# Patient Record
Sex: MALE | URBAN - METROPOLITAN AREA
[De-identification: names, ages, dates, MRNs, and addresses within clinical notes are randomized per-mention and may not be internally consistent; named-entity substitution may affect disease eponyms.]

---

## 2018-10-17 ENCOUNTER — INPATIENT (INPATIENT)
Facility: HOSPITAL | Age: 78
LOS: 1 days | Discharge: HOME | DRG: 287 | End: 2018-10-19
Attending: INTERNAL MEDICINE | Admitting: INTERNAL MEDICINE
Payer: COMMERCIAL

## 2018-10-17 VITALS
SYSTOLIC BLOOD PRESSURE: 159 MMHG | HEIGHT: 65 IN | HEART RATE: 60 BPM | OXYGEN SATURATION: 100 % | DIASTOLIC BLOOD PRESSURE: 79 MMHG | WEIGHT: 186.07 LBS | RESPIRATION RATE: 16 BRPM

## 2018-10-17 PROCEDURE — 93010 ELECTROCARDIOGRAM REPORT: CPT

## 2018-10-17 PROCEDURE — 99222 1ST HOSP IP/OBS MODERATE 55: CPT

## 2018-10-17 RX ORDER — METOPROLOL TARTRATE 50 MG
25 TABLET ORAL DAILY
Qty: 0 | Refills: 0 | Status: DISCONTINUED | OUTPATIENT
Start: 2018-10-18 | End: 2018-10-18

## 2018-10-17 RX ORDER — ATORVASTATIN CALCIUM 80 MG/1
40 TABLET, FILM COATED ORAL AT BEDTIME
Qty: 0 | Refills: 0 | Status: DISCONTINUED | OUTPATIENT
Start: 2018-10-17 | End: 2018-10-19

## 2018-10-17 RX ORDER — CLOPIDOGREL BISULFATE 75 MG/1
150 TABLET, FILM COATED ORAL ONCE
Qty: 0 | Refills: 0 | Status: COMPLETED | OUTPATIENT
Start: 2018-10-17 | End: 2018-10-17

## 2018-10-17 RX ORDER — ASPIRIN/CALCIUM CARB/MAGNESIUM 324 MG
243 TABLET ORAL ONCE
Qty: 0 | Refills: 0 | Status: COMPLETED | OUTPATIENT
Start: 2018-10-17 | End: 2018-10-17

## 2018-10-17 RX ORDER — SODIUM CHLORIDE 9 MG/ML
500 INJECTION INTRAMUSCULAR; INTRAVENOUS; SUBCUTANEOUS
Qty: 0 | Refills: 0 | Status: DISCONTINUED | OUTPATIENT
Start: 2018-10-17 | End: 2018-10-17

## 2018-10-17 RX ORDER — ZOLPIDEM TARTRATE 10 MG/1
1 TABLET ORAL
Qty: 0 | Refills: 0 | COMMUNITY

## 2018-10-17 RX ORDER — TAMSULOSIN HYDROCHLORIDE 0.4 MG/1
0.4 CAPSULE ORAL AT BEDTIME
Qty: 0 | Refills: 0 | Status: DISCONTINUED | OUTPATIENT
Start: 2018-10-17 | End: 2018-10-19

## 2018-10-17 RX ORDER — CHLORHEXIDINE GLUCONATE 213 G/1000ML
1 SOLUTION TOPICAL ONCE
Qty: 0 | Refills: 0 | Status: DISCONTINUED | OUTPATIENT
Start: 2018-10-17 | End: 2018-10-19

## 2018-10-17 RX ORDER — ASPIRIN/CALCIUM CARB/MAGNESIUM 324 MG
243 TABLET ORAL ONCE
Qty: 0 | Refills: 0 | Status: DISCONTINUED | OUTPATIENT
Start: 2018-10-17 | End: 2018-10-17

## 2018-10-17 RX ORDER — ASPIRIN/CALCIUM CARB/MAGNESIUM 324 MG
81 TABLET ORAL DAILY
Qty: 0 | Refills: 0 | Status: DISCONTINUED | OUTPATIENT
Start: 2018-10-17 | End: 2018-10-19

## 2018-10-17 RX ORDER — CLOPIDOGREL BISULFATE 75 MG/1
150 TABLET, FILM COATED ORAL ONCE
Qty: 0 | Refills: 0 | Status: DISCONTINUED | OUTPATIENT
Start: 2018-10-17 | End: 2018-10-17

## 2018-10-17 RX ORDER — SODIUM CHLORIDE 9 MG/ML
500 INJECTION INTRAMUSCULAR; INTRAVENOUS; SUBCUTANEOUS
Qty: 0 | Refills: 0 | Status: DISCONTINUED | OUTPATIENT
Start: 2018-10-17 | End: 2018-10-19

## 2018-10-17 RX ORDER — APIXABAN 2.5 MG/1
5 TABLET, FILM COATED ORAL EVERY 12 HOURS
Qty: 0 | Refills: 0 | Status: DISCONTINUED | OUTPATIENT
Start: 2018-10-18 | End: 2018-10-19

## 2018-10-17 RX ADMIN — CLOPIDOGREL BISULFATE 150 MILLIGRAM(S): 75 TABLET, FILM COATED ORAL at 18:46

## 2018-10-17 RX ADMIN — TAMSULOSIN HYDROCHLORIDE 0.4 MILLIGRAM(S): 0.4 CAPSULE ORAL at 22:10

## 2018-10-17 RX ADMIN — ATORVASTATIN CALCIUM 40 MILLIGRAM(S): 80 TABLET, FILM COATED ORAL at 22:10

## 2018-10-17 RX ADMIN — Medication 243 MILLIGRAM(S): at 18:46

## 2018-10-17 NOTE — H&P ADULT - HISTORY OF PRESENT ILLNESS
SKELETON     77 yo Uzbek speaking Male, former smoker, PMHx HTN, hyperlipidemia, DM type 2, CVA 2013 (residual right facial droop and right sided weakness) presented to [facility] on 10/14/18 after an episode of dizziness lasting 25 minutes while at [employer], relieved with orange juice. Pt states he had breakfast and did not take his Glyburide/Metformin. Pt states he had a similar episode 1 year ago but did not seek medical attention. Denies trauma, headache, fever, LOC, aura before the attacks. Also denies chest pain, SOB, nausea, diarrhea. SKELETON     79 yo Turkish speaking Male, former smoker, PMHx HTN, hyperlipidemia, DM type 2, CVA 2013 (residual right facial droop and right sided weakness) presented to [facility] on 10/14/18 after an episode of dizziness lasting 25 minutes while at [employer], relieved with orange juice. Pt states he had breakfast and did not take his Glyburide/Metformin. Pt states he had a similar episode 1 year ago but did not seek medical attention. Denies trauma, headache, fever, LOC, aura before the attacks. Also denies chest pain, SOB, nausea, diarrhea. CT head 10/14/18 negative for acute changes. Moderate chronic microvascular ischemic changes and remote lacunar infarcts. Multiple air-fluid levels c/w acute sinusitus. CXR negative. Troponin I elevated at 0.252 and EKG NSR with PAC, LAD, poor R wave progression, u wave. Pt underwent diagnostic cardiac cath on 10/15/18 SKELETON    77 yo Kyrgyz speaking Male, former smoker, PMHx HTN, hyperlipidemia, DM type 2, CVA 2013 (residual right facial droop and right sided weakness) presented to [facility] on 10/14/18 after an episode of dizziness lasting 25 minutes while at [employer], relieved with orange juice. Pt states he had breakfast and did not take his Glyburide/Metformin. Pt states he had a similar episode 1 year ago but did not seek medical attention. Denies trauma, headache, fever, LOC, aura before the attacks. Also denies chest pain, SOB, nausea, diarrhea. CT head 10/14/18 negative for acute changes. Moderate chronic microvascular ischemic changes and remote lacunar infarcts. Multiple air-fluid levels c/w acute sinusitus. CXR negative. Troponin I elevated at 0.252 and EKG NSR with PAC, LAD, poor R wave progression, u wave. Pt underwent diagnostic cardiac cath on 10/15/18 LM nl. dLAD 75%. oD1 75%. mLCX 65%. oOM1 65%. EF 55%. EDP 21mmHg. Echo 10/15/18 mild concentric LVH. EF 55-60%. Mild MR/TR. PASP 30mmHg. SKELETON    79 yo Tajik speaking Male, former smoker, PMHx HTN, hyperlipidemia, DM type 2, CVA 2013 (residual right facial droop and right sided weakness) presented to [facility] on 10/14/18 after an episode of dizziness lasting 25 minutes while at [employer], relieved with orange juice. Pt states he had breakfast and did not take his Glyburide/Metformin. Pt states he had a similar episode 1 year ago but did not seek medical attention. Denies trauma, headache, fever, LOC, aura before the attacks. Also denies chest pain, SOB, nausea, diarrhea. CT head 10/14/18 negative for acute changes. Moderate chronic microvascular ischemic changes and remote lacunar infarcts. Multiple air-fluid levels c/w acute sinusitus. CXR negative. Troponin I elevated at 0.252 and EKG NSR with PAC, LAD, poor R wave progression, u wave. Pt underwent diagnostic cardiac cath on 10/15/18 LM nl. dLAD 75%. oD1 75%. mLCX 65%. oOM1 65%. EF 55%. EDP 21mmHg. Echo 10/15/18 mild concentric LVH. EF 55-60%. Mild MR/TR. PASP 30mmHg.     In light of patient's risk factors, CCS Class 3 anginal symptoms, and abnormal diagnostic cardiac catheterization pt is transferred to [facility] for cardiac catheterization with FFR and possible intervention. SKELETON    79 yo Yi speaking Male, former smoker, PMHx HTN, hyperlipidemia, DM type 2, CVA 2013 (residual right facial droop and right sided weakness) presented to [facility] on 10/14/18 after an episode of dizziness lasting 25 minutes while at [employer], relieved with orange juice. Pt states he had breakfast and did not take his Glyburide/Metformin. Pt states he had a similar episode 1 year ago but did not seek medical attention. Denies trauma, headache, fever, LOC, aura before the attacks. Also denies chest pain, SOB, nausea, diarrhea. CT head 10/14/18 negative for acute changes. Moderate chronic microvascular ischemic changes and remote lacunar infarcts. Multiple air-fluid levels c/w acute sinusitus. CXR negative. Troponin I elevated at 0.252 and EKG NSR with PAC, LAD, poor R wave progression, u wave. Pt underwent diagnostic cardiac cath on 10/15/18 LM nl. dLAD 75%. oD1 75%. mLCX 65%. oOM1 65%. EF 55%. EDP 21mmHg. Right radial access. Echo 10/15/18 mild concentric LVH. EF 55-60%. Mild MR/TR. PASP 30mmHg. As per nurse at [facility] pt diagnosed with new onset rate controlled aflutter at [facility]. Pt placed on a heparin drip on 10/15/18 post cardiac cath. Pt received Plavix 450mg at [facility] and Aspirin 81mg on 10/17/18 AM. Pt will need Aspirin 243mg and Plavix 150mg prior to cardiac catheterization at [facility].     In light of patient's risk factors, CCS Class 3 anginal symptoms, and abnormal diagnostic cardiac catheterization pt is transferred to [facility] for cardiac catheterization with FFR and possible intervention. 77 yo Greek speaking Male, former smoker, PMHx HTN, hyperlipidemia, DM type 2, CVA 2013 (residual right facial droop and right sided weakness) presented to [facility] on 10/14/18 after an episode of dizziness lasting 25 minutes while at [employer], relieved with orange juice. Pt states he had breakfast and did not take his Glyburide/Metformin. Pt states he had a similar episode 1 year ago but did not seek medical attention. Denies trauma, headache, fever, LOC, aura before the attacks. Also denies chest pain, SOB, nausea, diarrhea. CT head 10/14/18 negative for acute changes. Moderate chronic microvascular ischemic changes and remote lacunar infarcts. Multiple air-fluid levels c/w acute sinusitus. CXR negative. Troponin I elevated at 0.252 and EKG NSR with PAC, LAD, poor R wave progression, u wave. Pt underwent diagnostic cardiac cath on 10/15/18 LM nl. dLAD 75%. oD1 75%. mLCX 65%. oOM1 65%. EF 55%. EDP 21mmHg. Right radial access. Echo 10/15/18 mild concentric LVH. EF 55-60%. Mild MR/TR. PASP 30mmHg. As per nurse at [facility] pt diagnosed with new onset rate controlled aflutter at [facility]. Pt placed on a heparin drip on 10/15/18 post cardiac cath. Pt received Plavix 450mg at [facility] and Aspirin 81mg on 10/17/18 AM. Pt will need Aspirin 243mg and Plavix 150mg prior to cardiac catheterization at [facility].     In light of patient's risk factors, CCS Class 3 anginal symptoms, and abnormal diagnostic cardiac catheterization pt is transferred to [facility] for cardiac catheterization with FFR and possible intervention.

## 2018-10-17 NOTE — H&P ADULT - ASSESSMENT
77 yo Samoan speaking Male, former smoker, PMHx HTN, hyperlipidemia, DM type 2, CVA 2013 (residual right facial droop and right sided weakness) presented to [facility] on 10/14/18 after an episode of dizziness lasting 25 minutes while at [employer], relieved with orange juice. Pt states he had breakfast and did not take his Glyburide/Metformin. Pt states he had a similar episode 1 year ago but did not seek medical attention. Denies trauma, headache, fever, LOC, aura before the attacks. Also denies chest pain, SOB, nausea, diarrhea. CT head 10/14/18 negative for acute changes. Moderate chronic microvascular ischemic changes and remote lacunar infarcts. Multiple air-fluid levels c/w acute sinusitus. CXR negative. Troponin I elevated at 0.252 and EKG NSR with PAC, LAD, poor R wave progression, u wave. Pt underwent diagnostic cardiac cath on 10/15/18 LM nl. dLAD 75%. oD1 75%. mLCX 65%. oOM1 65%. EF 55%. EDP 21mmHg. Right radial access. Echo 10/15/18 mild concentric LVH. EF 55-60%. Mild MR/TR. PASP 30mmHg. As per nurse at [facility] pt diagnosed with new onset rate controlled aflutter at [facility]. Pt placed on a heparin drip on 10/15/18 post cardiac cath. Pt received Plavix 450mg at [facility] and Aspirin 81mg on 10/17/18 AM. Pt will need Aspirin 243mg and Plavix 150mg prior to cardiac catheterization at [facility]. In light of patient's risk factors, CCS Class 3 anginal symptoms, and abnormal diagnostic cardiac catheterization pt is transferred to [facility] for cardiac catheterization with FFR and possible intervention.     Pt given Plavix 150mg and Aspirin 243 prior to procedure. Labs from [facility] stable. Cr wnl.

## 2018-10-18 DIAGNOSIS — R63.8 OTHER SYMPTOMS AND SIGNS CONCERNING FOOD AND FLUID INTAKE: ICD-10-CM

## 2018-10-18 DIAGNOSIS — I48.91 UNSPECIFIED ATRIAL FIBRILLATION: ICD-10-CM

## 2018-10-18 DIAGNOSIS — I25.10 ATHEROSCLEROTIC HEART DISEASE OF NATIVE CORONARY ARTERY WITHOUT ANGINA PECTORIS: ICD-10-CM

## 2018-10-18 DIAGNOSIS — E78.5 HYPERLIPIDEMIA, UNSPECIFIED: ICD-10-CM

## 2018-10-18 DIAGNOSIS — I10 ESSENTIAL (PRIMARY) HYPERTENSION: ICD-10-CM

## 2018-10-18 DIAGNOSIS — G47.33 OBSTRUCTIVE SLEEP APNEA (ADULT) (PEDIATRIC): ICD-10-CM

## 2018-10-18 DIAGNOSIS — Z91.89 OTHER SPECIFIED PERSONAL RISK FACTORS, NOT ELSEWHERE CLASSIFIED: ICD-10-CM

## 2018-10-18 DIAGNOSIS — E11.9 TYPE 2 DIABETES MELLITUS WITHOUT COMPLICATIONS: ICD-10-CM

## 2018-10-18 DIAGNOSIS — N40.0 BENIGN PROSTATIC HYPERPLASIA WITHOUT LOWER URINARY TRACT SYMPTOMS: ICD-10-CM

## 2018-10-18 LAB
ANION GAP SERPL CALC-SCNC: 10 MMOL/L — SIGNIFICANT CHANGE UP (ref 5–17)
APTT BLD: 41.9 SEC — HIGH (ref 27.5–37.4)
BUN SERPL-MCNC: 21 MG/DL — SIGNIFICANT CHANGE UP (ref 7–23)
CALCIUM SERPL-MCNC: 9.6 MG/DL — SIGNIFICANT CHANGE UP (ref 8.4–10.5)
CHLORIDE SERPL-SCNC: 98 MMOL/L — SIGNIFICANT CHANGE UP (ref 96–108)
CHOLEST SERPL-MCNC: 102 MG/DL — SIGNIFICANT CHANGE UP (ref 10–199)
CO2 SERPL-SCNC: 27 MMOL/L — SIGNIFICANT CHANGE UP (ref 22–31)
CREAT SERPL-MCNC: 0.95 MG/DL — SIGNIFICANT CHANGE UP (ref 0.5–1.3)
GLUCOSE SERPL-MCNC: 192 MG/DL — HIGH (ref 70–99)
HBA1C BLD-MCNC: 6.4 % — HIGH (ref 4–5.6)
HCT VFR BLD CALC: 42.6 % — SIGNIFICANT CHANGE UP (ref 39–50)
HDLC SERPL-MCNC: 35 MG/DL — LOW
HGB BLD-MCNC: 14.1 G/DL — SIGNIFICANT CHANGE UP (ref 13–17)
INR BLD: 1.35 — HIGH (ref 0.88–1.16)
LIPID PNL WITH DIRECT LDL SERPL: 50 MG/DL — SIGNIFICANT CHANGE UP
MAGNESIUM SERPL-MCNC: 2.2 MG/DL — SIGNIFICANT CHANGE UP (ref 1.6–2.6)
MCHC RBC-ENTMCNC: 29.9 PG — SIGNIFICANT CHANGE UP (ref 27–34)
MCHC RBC-ENTMCNC: 33.1 G/DL — SIGNIFICANT CHANGE UP (ref 32–36)
MCV RBC AUTO: 90.3 FL — SIGNIFICANT CHANGE UP (ref 80–100)
PLATELET # BLD AUTO: 182 K/UL — SIGNIFICANT CHANGE UP (ref 150–400)
POTASSIUM SERPL-MCNC: 4.2 MMOL/L — SIGNIFICANT CHANGE UP (ref 3.5–5.3)
POTASSIUM SERPL-SCNC: 4.2 MMOL/L — SIGNIFICANT CHANGE UP (ref 3.5–5.3)
PROTHROM AB SERPL-ACNC: 15.1 SEC — HIGH (ref 9.8–12.7)
RBC # BLD: 4.72 M/UL — SIGNIFICANT CHANGE UP (ref 4.2–5.8)
RBC # FLD: 14 % — SIGNIFICANT CHANGE UP (ref 10.3–16.9)
SODIUM SERPL-SCNC: 135 MMOL/L — SIGNIFICANT CHANGE UP (ref 135–145)
TOTAL CHOLESTEROL/HDL RATIO MEASUREMENT: 2.9 RATIO — LOW (ref 3.4–9.6)
TRIGL SERPL-MCNC: 83 MG/DL — SIGNIFICANT CHANGE UP (ref 10–149)
TSH SERPL-MCNC: 3.62 UIU/ML — SIGNIFICANT CHANGE UP (ref 0.35–4.94)
WBC # BLD: 6.4 K/UL — SIGNIFICANT CHANGE UP (ref 3.8–10.5)
WBC # FLD AUTO: 6.4 K/UL — SIGNIFICANT CHANGE UP (ref 3.8–10.5)

## 2018-10-18 PROCEDURE — 99232 SBSQ HOSP IP/OBS MODERATE 35: CPT

## 2018-10-18 RX ORDER — DEXTROSE 50 % IN WATER 50 %
25 SYRINGE (ML) INTRAVENOUS ONCE
Qty: 0 | Refills: 0 | Status: DISCONTINUED | OUTPATIENT
Start: 2018-10-18 | End: 2018-10-19

## 2018-10-18 RX ORDER — SODIUM CHLORIDE 9 MG/ML
1000 INJECTION, SOLUTION INTRAVENOUS
Qty: 0 | Refills: 0 | Status: DISCONTINUED | OUTPATIENT
Start: 2018-10-18 | End: 2018-10-19

## 2018-10-18 RX ORDER — DEXTROSE 50 % IN WATER 50 %
12.5 SYRINGE (ML) INTRAVENOUS ONCE
Qty: 0 | Refills: 0 | Status: DISCONTINUED | OUTPATIENT
Start: 2018-10-18 | End: 2018-10-19

## 2018-10-18 RX ORDER — LISINOPRIL 2.5 MG/1
5 TABLET ORAL DAILY
Qty: 0 | Refills: 0 | Status: DISCONTINUED | OUTPATIENT
Start: 2018-10-18 | End: 2018-10-19

## 2018-10-18 RX ORDER — GLUCAGON INJECTION, SOLUTION 0.5 MG/.1ML
1 INJECTION, SOLUTION SUBCUTANEOUS ONCE
Qty: 0 | Refills: 0 | Status: DISCONTINUED | OUTPATIENT
Start: 2018-10-18 | End: 2018-10-19

## 2018-10-18 RX ORDER — DEXTROSE 50 % IN WATER 50 %
15 SYRINGE (ML) INTRAVENOUS ONCE
Qty: 0 | Refills: 0 | Status: DISCONTINUED | OUTPATIENT
Start: 2018-10-18 | End: 2018-10-19

## 2018-10-18 RX ORDER — INSULIN LISPRO 100/ML
VIAL (ML) SUBCUTANEOUS
Qty: 0 | Refills: 0 | Status: DISCONTINUED | OUTPATIENT
Start: 2018-10-18 | End: 2018-10-19

## 2018-10-18 RX ADMIN — ATORVASTATIN CALCIUM 40 MILLIGRAM(S): 80 TABLET, FILM COATED ORAL at 21:11

## 2018-10-18 RX ADMIN — TAMSULOSIN HYDROCHLORIDE 0.4 MILLIGRAM(S): 0.4 CAPSULE ORAL at 21:12

## 2018-10-18 RX ADMIN — Medication 81 MILLIGRAM(S): at 11:29

## 2018-10-18 RX ADMIN — LISINOPRIL 5 MILLIGRAM(S): 2.5 TABLET ORAL at 15:18

## 2018-10-18 RX ADMIN — APIXABAN 5 MILLIGRAM(S): 2.5 TABLET, FILM COATED ORAL at 18:23

## 2018-10-18 RX ADMIN — Medication 6: at 11:29

## 2018-10-18 RX ADMIN — APIXABAN 5 MILLIGRAM(S): 2.5 TABLET, FILM COATED ORAL at 06:24

## 2018-10-18 NOTE — PROGRESS NOTE ADULT - ATTENDING COMMENTS
Patient seen and examined with housestaff at bedside.  Assessment and plan discussed at length and formulated as noted.

## 2018-10-18 NOTE — PROGRESS NOTE ADULT - PROBLEM SELECTOR PLAN 8
1) PCP Contacted on Admission: (Y/N) --> Name & Phone #:  2) Date of Contact with PCP:  3) PCP Contacted at Discharge: (Y/N, N/A)  4) Summary of Handoff Given to PCP:   5) Post-Discharge Appointment Date and Location: F: Tolerating PO  E: Replete PRN  N: DASH/TLC  FULL CODE  Prophy: Eliquis for afib  Dispo: Inpatient pending improvement in bradycardia

## 2018-10-18 NOTE — PROGRESS NOTE ADULT - ASSESSMENT
This is a 79 yo man with history of HTN, HLD, T2DM, CVA 2013 (residual R sided weakness and R facial droop) who presented as a transfer from [facility] for dizziness transferred for monitoring s/p cardiac cath with FFR.

## 2018-10-18 NOTE — PROGRESS NOTE ADULT - PROBLEM SELECTOR PLAN 2
- Non-valvular  - Continue Eliquis 5mg q12 - Non-valvular afib  - Continue Eliquis 5mg q12 - Non-valvular afib, rate controlled  - Continue Eliquis 5mg q12  - Discontinued beta blocker given bradycardia and frequent pauses overnight, if continues to have pauses will call EP to assess for potential pacemaker - Non-valvular afib, rate controlled  - Continue Eliquis 5mg q12  - Discontinued beta blocker given bradycardia and frequent pauses overnight, if continues to have pauses after DCing beta blocker will need EP eval

## 2018-10-18 NOTE — PROGRESS NOTE ADULT - PROBLEM SELECTOR PLAN 7
F: Tolerating PO  E: Replete PRN  N: DASH/TLC  FULL CODE  Prophy: Eliquis for afib  Dispo: Inpatient pending F: Tolerating PO  E: Replete PRN  N: DASH/TLC  FULL CODE  Prophy: Eliquis for afib  Dispo: Inpatient pending improvement in bradycardia - Continue home flomax 0.4mg

## 2018-10-18 NOTE — PROGRESS NOTE ADULT - PROBLEM SELECTOR PLAN 6
- Continue home flomax 0.4mg - Prior diagnosis of MARTHA, noncompliant with home CPAP  - Continue CPAP while inpatient

## 2018-10-18 NOTE — PROGRESS NOTE ADULT - SUBJECTIVE AND OBJECTIVE BOX
OVERNIGHT EVENTS: Cath with FFR negative    SUBJECTIVE / INTERVAL HPI: Patient seen and examined at bedside. Feeling well, seen sitting in chair, denies chest pain, denies shortness of breath.     VITAL SIGNS:  Vital Signs Last 24 Hrs  T(C): 36.2 (18 Oct 2018 10:13), Max: 36.8 (18 Oct 2018 04:43)  T(F): 97.1 (18 Oct 2018 10:13), Max: 98.3 (18 Oct 2018 04:43)  HR: 58 (18 Oct 2018 08:47) (52 - 60)  BP: 128/60 (18 Oct 2018 08:47) (116/73 - 174/90)  BP(mean): 88 (18 Oct 2018 08:47) (88 - 120)  RR: 23 (18 Oct 2018 08:47) (12 - 23)  SpO2: 98% (18 Oct 2018 08:47) (96% - 99%)    PHYSICAL EXAM:    General: Well developed, well nourished, no acute distress  HEENT: NC/AT; PERRL, anicteric sclera; MMM  Neck: supple  Cardiovascular: +S1/S2, RRR, no murmurs, rubs, gallops  Respiratory: CTA B/L; no W/R/R  Gastrointestinal: soft, NT/ND; +BSx4  Extremities: WWP; no edema, clubbing or cyanosis  Vascular: 2+ radial and pedal pulses  Neurological: AAOx3; no focal deficits    MEDICATIONS:  MEDICATIONS  (STANDING):  apixaban 5 milliGRAM(s) Oral every 12 hours  aspirin  chewable 81 milliGRAM(s) Oral daily  atorvastatin 40 milliGRAM(s) Oral at bedtime  chlorhexidine 4% Liquid 1 Application(s) Topical once  dextrose 5%. 1000 milliLiter(s) (50 mL/Hr) IV Continuous <Continuous>  dextrose 50% Injectable 12.5 Gram(s) IV Push once  dextrose 50% Injectable 25 Gram(s) IV Push once  dextrose 50% Injectable 25 Gram(s) IV Push once  insulin lispro (HumaLOG) corrective regimen sliding scale   SubCutaneous Before meals and at bedtime  sodium chloride 0.9%. 500 milliLiter(s) (75 mL/Hr) IV Continuous <Continuous>  tamsulosin 0.4 milliGRAM(s) Oral at bedtime    MEDICATIONS  (PRN):  dextrose 40% Gel 15 Gram(s) Oral once PRN Blood Glucose LESS THAN 70 milliGRAM(s)/deciliter  glucagon  Injectable 1 milliGRAM(s) IntraMuscular once PRN Glucose LESS THAN 70 milligrams/deciliter      ALLERGIES:  Allergies    penicillins (Unknown)    POCT Blood Glucose.: 262 mg/dL (18 Oct 2018 11:15)      RADIOLOGY & ADDITIONAL TESTS: Reviewed.    -Echo 10/15/18 mild concentric LVH. EF 55-60%. Mild MR/TR. PASP 30mmHg    - Diagnostic cardiac cath on 10/15/18 LM nl. dLAD 75%. oD1 75%. mLCX 65%. oOM1 65%. EF 55%. EDP 21mmHg

## 2018-10-18 NOTE — PROGRESS NOTE ADULT - PROBLEM SELECTOR PLAN 1
- Continue ASA  - Continue Atorvastatin 40mg - Cardiac cath: 10/15/18: LM nl. dLAD 75%. oD1 75%. mLCX 65%. oOM1 65%. EF 55%. EDP 21mmHg  - Cardiac cath with FFR 10/17/18 negative  - Echo 10/15/18 mild concentric LVH. EF 55-60%. Mild MR/TR. PASP 30mmHg  - Continue ASA  - Continue Atorvastatin 40mg - Cardiac cath: 10/15/18: LM nl. dLAD 75%. oD1 75%. mLCX 65%. oOM1 65%. EF 55%. EDP 21mmHg  - Cardiac cath with FFR 10/17/18 negative  - Echo 10/15/18 mild concentric LVH. EF 55-60%. Mild MR/TR. PASP 30mmHg  - Continue ASA  - Continue Atorvastatin 40mg  - Discontinued beta blocker given bradycardia and frequent pauses overnight - Cardiac cath: 10/15/18: LM nl. dLAD 75%. oD1 75%. mLCX 65%. oOM1 65%. EF 55%. EDP 21mmHg  - Cardiac cath with FFR 10/17/18 negative  - Echo 10/15/18 mild concentric LVH. EF 55-60%. Mild MR/TR. PASP 30mmHg  - Continue ASA  - Continue Atorvastatin 40mg  - Discontinued beta blocker given bradycardia and frequent pauses overnight, if continues to have pauses will call EP to assess for potential pacemaker - Cardiac cath: 10/15/18: LM nl. dLAD 75%. oD1 75%. mLCX 65%. oOM1 65%. EF 55%. EDP 21mmHg  - Cardiac cath with FFR 10/17/18 negative  - Echo 10/15/18 mild concentric LVH. EF 55-60%. Mild MR/TR. PASP 30mmHg  - Continue ASA  - Continue Atorvastatin 40mg  - Discontinued beta blocker given bradycardia and frequent pauses overnight, if continues to have pauses off of beta blocker will need EP eval

## 2018-10-19 ENCOUNTER — TRANSCRIPTION ENCOUNTER (OUTPATIENT)
Age: 78
End: 2018-10-19

## 2018-10-19 VITALS — TEMPERATURE: 98 F

## 2018-10-19 PROBLEM — E11.9 TYPE 2 DIABETES MELLITUS WITHOUT COMPLICATIONS: Chronic | Status: ACTIVE | Noted: 2018-10-17

## 2018-10-19 PROBLEM — Z00.00 ENCOUNTER FOR PREVENTIVE HEALTH EXAMINATION: Status: ACTIVE | Noted: 2018-10-19

## 2018-10-19 PROBLEM — I10 ESSENTIAL (PRIMARY) HYPERTENSION: Chronic | Status: ACTIVE | Noted: 2018-10-17

## 2018-10-19 PROBLEM — E78.5 HYPERLIPIDEMIA, UNSPECIFIED: Chronic | Status: ACTIVE | Noted: 2018-10-17

## 2018-10-19 LAB
ANION GAP SERPL CALC-SCNC: 13 MMOL/L — SIGNIFICANT CHANGE UP (ref 5–17)
BUN SERPL-MCNC: 20 MG/DL — SIGNIFICANT CHANGE UP (ref 7–23)
CALCIUM SERPL-MCNC: 9.5 MG/DL — SIGNIFICANT CHANGE UP (ref 8.4–10.5)
CHLORIDE SERPL-SCNC: 100 MMOL/L — SIGNIFICANT CHANGE UP (ref 96–108)
CO2 SERPL-SCNC: 25 MMOL/L — SIGNIFICANT CHANGE UP (ref 22–31)
CREAT SERPL-MCNC: 0.87 MG/DL — SIGNIFICANT CHANGE UP (ref 0.5–1.3)
GLUCOSE SERPL-MCNC: 123 MG/DL — HIGH (ref 70–99)
HBA1C BLD-MCNC: 6.3 % — HIGH (ref 4–5.6)
HCT VFR BLD CALC: 43.1 % — SIGNIFICANT CHANGE UP (ref 39–50)
HGB BLD-MCNC: 14.7 G/DL — SIGNIFICANT CHANGE UP (ref 13–17)
MAGNESIUM SERPL-MCNC: 2.1 MG/DL — SIGNIFICANT CHANGE UP (ref 1.6–2.6)
MCHC RBC-ENTMCNC: 30.6 PG — SIGNIFICANT CHANGE UP (ref 27–34)
MCHC RBC-ENTMCNC: 34.1 G/DL — SIGNIFICANT CHANGE UP (ref 32–36)
MCV RBC AUTO: 89.6 FL — SIGNIFICANT CHANGE UP (ref 80–100)
PLATELET # BLD AUTO: 175 K/UL — SIGNIFICANT CHANGE UP (ref 150–400)
POTASSIUM SERPL-MCNC: 3.8 MMOL/L — SIGNIFICANT CHANGE UP (ref 3.5–5.3)
POTASSIUM SERPL-SCNC: 3.8 MMOL/L — SIGNIFICANT CHANGE UP (ref 3.5–5.3)
RBC # BLD: 4.81 M/UL — SIGNIFICANT CHANGE UP (ref 4.2–5.8)
RBC # FLD: 13.6 % — SIGNIFICANT CHANGE UP (ref 10.3–16.9)
SODIUM SERPL-SCNC: 138 MMOL/L — SIGNIFICANT CHANGE UP (ref 135–145)
WBC # BLD: 6.5 K/UL — SIGNIFICANT CHANGE UP (ref 3.8–10.5)
WBC # FLD AUTO: 6.5 K/UL — SIGNIFICANT CHANGE UP (ref 3.8–10.5)

## 2018-10-19 PROCEDURE — 99238 HOSP IP/OBS DSCHRG MGMT 30/<: CPT

## 2018-10-19 RX ORDER — METOPROLOL TARTRATE 50 MG
1 TABLET ORAL
Qty: 0 | Refills: 0 | COMMUNITY

## 2018-10-19 RX ORDER — TAMSULOSIN HYDROCHLORIDE 0.4 MG/1
1 CAPSULE ORAL
Qty: 0 | Refills: 0 | COMMUNITY
Start: 2018-10-19

## 2018-10-19 RX ORDER — ATORVASTATIN CALCIUM 80 MG/1
1 TABLET, FILM COATED ORAL
Qty: 30 | Refills: 0 | OUTPATIENT
Start: 2018-10-19 | End: 2018-11-17

## 2018-10-19 RX ORDER — LISINOPRIL 2.5 MG/1
1 TABLET ORAL
Qty: 30 | Refills: 0 | OUTPATIENT
Start: 2018-10-19 | End: 2018-11-17

## 2018-10-19 RX ORDER — POTASSIUM CHLORIDE 20 MEQ
20 PACKET (EA) ORAL ONCE
Qty: 0 | Refills: 0 | Status: COMPLETED | OUTPATIENT
Start: 2018-10-19 | End: 2018-10-19

## 2018-10-19 RX ORDER — APIXABAN 2.5 MG/1
1 TABLET, FILM COATED ORAL
Qty: 60 | Refills: 0 | OUTPATIENT
Start: 2018-10-19 | End: 2018-11-17

## 2018-10-19 RX ORDER — ASPIRIN/CALCIUM CARB/MAGNESIUM 324 MG
1 TABLET ORAL
Qty: 30 | Refills: 0 | OUTPATIENT
Start: 2018-10-19 | End: 2018-11-17

## 2018-10-19 RX ORDER — TAMSULOSIN HYDROCHLORIDE 0.4 MG/1
1 CAPSULE ORAL
Qty: 30 | Refills: 0 | OUTPATIENT
Start: 2018-10-19 | End: 2018-11-17

## 2018-10-19 RX ORDER — TAMSULOSIN HYDROCHLORIDE 0.4 MG/1
1 CAPSULE ORAL
Qty: 0 | Refills: 0 | COMMUNITY

## 2018-10-19 RX ORDER — ZALEPLON 10 MG
5 CAPSULE ORAL AT BEDTIME
Qty: 0 | Refills: 0 | Status: DISCONTINUED | OUTPATIENT
Start: 2018-10-19 | End: 2018-10-19

## 2018-10-19 RX ADMIN — LISINOPRIL 5 MILLIGRAM(S): 2.5 TABLET ORAL at 05:35

## 2018-10-19 RX ADMIN — APIXABAN 5 MILLIGRAM(S): 2.5 TABLET, FILM COATED ORAL at 05:35

## 2018-10-19 RX ADMIN — Medication 81 MILLIGRAM(S): at 11:34

## 2018-10-19 RX ADMIN — Medication 20 MILLIEQUIVALENT(S): at 11:34

## 2018-10-19 RX ADMIN — Medication 5 MILLIGRAM(S): at 02:10

## 2018-10-19 NOTE — DISCHARGE NOTE ADULT - ADDITIONAL INSTRUCTIONS
Follow up with Dr. Gilbert Follow up with Dr. Gilbert at the [place] office. Call [phone] to discuss the appointment time. Follow up with Dr. Gilbert at the [place] office at [address], [place] [address]  - Call [phone]  - You MUST call your primary care doctor to obtain a referral to Dr. Gilbert. Her HIP number is [id number]. Please have the referral faxed over to her office at [phone] BEFORE your appointment.  - Your appointment is on Monday, October 29th, 2018 at 12 PM.

## 2018-10-19 NOTE — DISCHARGE NOTE ADULT - PATIENT PORTAL LINK FT
You can access the [facility] Patient Portal, offered by [facility], by registering with the following website: [email or web address]

## 2018-10-19 NOTE — DISCHARGE NOTE ADULT - MEDICATION SUMMARY - MEDICATIONS TO TAKE
I will START or STAY ON the medications listed below when I get home from the hospital:    aspirin 81 mg oral tablet  -- 1 tab(s) by mouth once a day   -- Indication: For Coronary artery disease    lisinopril 5 mg oral tablet  -- 1 tab(s) by mouth once a day x 30 days   -- Indication: For Hypertension    tamsulosin 0.4 mg oral capsule  -- 1 cap(s) by mouth once a day (at bedtime)  -- Indication: For BPH (benign prostatic hyperplasia)    apixaban 5 mg oral tablet  -- 1 tab(s) by mouth every 12 hours  -- Indication: For Atrial fibrillation    glyburide-metformin 5 mg-500 mg oral tablet  -- 1 tab(s) by mouth 2 times a day  -- Indication: For Diabetes    atorvastatin 40 mg oral tablet  -- 1 tab(s) by mouth once a day (at bedtime)  -- Indication: For Hyperlipidemia

## 2018-10-19 NOTE — DISCHARGE NOTE ADULT - HOSPITAL COURSE
This is a 79 yo man with history of HTN, HLD, T2DM, CVA 2013 (residual R sided weakness and R facial droop) who presented as a transfer from [facility] for dizziness transferred for monitoring s/p cardiac cath with FFR. Patient underwent cardiac cath on 10/15/18 LM nl. dLAD 75%. oD1 75%. mLCX 65%. oOM1 65%. EF 55%. EDP 21mmHg. Cardiac cath with FFR performed 10/17/18 and negative. Repeat Echo This is a 79 yo man with history of HTN, HLD, T2DM, CVA 2013 (residual R sided weakness and R facial droop) who presented as a transfer from [facility] for dizziness transferred for monitoring s/p cardiac cath with FFR. Patient underwent cardiac cath on 10/15/18 LM nl. dLAD 75%. oD1 75%. mLCX 65%. oOM1 65%. EF 55%. EDP 21mmHg. Cardiac cath with FFR performed 10/17/18 and negative. Repeat Echo showed Mild concentric LVH with EF 55-60%, mild MR/TR and PASP 30mmg. Patient was discharged on aspirin, plavix, and Eliquis. Home beta blocker metoprolol was discontinued in the setting of slow afib and bradycardia. Patient was evaluated by electrophysiology who did not recommend a pacemaker. Follow up[ instructions given to patient and daughter. Patient was hemodynamically stable at time of discharge with no acute complaints, in no distress and with all questions answered and return precautions given.

## 2018-10-19 NOTE — PROGRESS NOTE ADULT - PROBLEM SELECTOR PLAN 9
1) PCP Contacted on Admission: (Y/N) --> Name & Phone #:   2) Date of Contact with PCP:  3) PCP Contacted at Discharge: (Y/N, N/A)  4) Summary of Handoff Given to PCP:   5) Post-Discharge Appointment Date and Location: F/u Dr. Gilbert on *** at ***
1) PCP Contacted on Admission: (Y/N) --> Name & Phone #:  2) Date of Contact with PCP:  3) PCP Contacted at Discharge: (Y/N, N/A)  4) Summary of Handoff Given to PCP:   5) Post-Discharge Appointment Date and Location:

## 2018-10-19 NOTE — PROGRESS NOTE ADULT - PROBLEM SELECTOR PLAN 2
- Non-valvular afib, not rate controlled  - Continue Eliquis 5mg q12  - EP consulted in setting of slow afib and afib with RVR labile HR, appreciate recs for potential pacemaker placement

## 2018-10-19 NOTE — DISCHARGE NOTE ADULT - CARE PROVIDER_API CALL
Rosalinda Glibert), Cardiovascular Disease; Internal Medicine  [address] [id number]  Phone: [phone]  Fax: [phone]

## 2018-10-19 NOTE — PROGRESS NOTE ADULT - PROBLEM SELECTOR PLAN 3
- Currently normotensive  - On toprol XL outpatient, discontinued in setting of bradycardia  - Continue lisinopril 5mg and uptitrate outpatient  - to follow with Dr. Gilbert outpatient
- Currently normotensive  - On toprol XL outpatient, discontinued in setting of bradycardia  - Pending Cr will add on ACEI

## 2018-10-19 NOTE — PROGRESS NOTE ADULT - PROBLEM SELECTOR PLAN 8
F: Tolerating PO  E: Replete PRN  N: DASH/TLC  FULL CODE  Prophy: Eliquis for afib  Dispo: Inpatient pending evaluation for pacemaker placement

## 2018-10-19 NOTE — DISCHARGE NOTE ADULT - CARE PLAN
Principal Discharge DX:	Coronary artery disease  Goal:	Continue home medications and follow with Dr. Gilbert on Monday, October 29th at 12 PM  Assessment and plan of treatment:	You came to the hospital for a catheterization and a second catheterization called an FFR which is more specialized. No intervention of your coronary arteries was performed. Your echo showed your heart function is 55-60%. You should continue the aspirin and atorvastatin 40mg and STOP your metoprolol until you see Dr. Gilbert in the office.  Secondary Diagnosis:	Atrial fibrillation  Assessment and plan of treatment:	You have atrial fibrillation and should continue on your Eliquis for anticoagulation 5mg every 12 hours. You were evaluated by electrophysiology who said that there was no need for the placement of a pacemaker.  Secondary Diagnosis:	Hypertension  Assessment and plan of treatment:	Continue your home medications. Stop your home metoprolol.  Secondary Diagnosis:	Hyperlipidemia  Assessment and plan of treatment:	Continue atorvastatin and follow with Dr. Gilbert and your primary care doctor.  Secondary Diagnosis:	Diabetes mellitus  Assessment and plan of treatment:	Continue your home medications and follow up with your primary care doctor.  Secondary Diagnosis:	MARTHA (obstructive sleep apnea)  Assessment and plan of treatment:	Continue using CPAP. Use the new mask you got in the hospital. If it does not fit your new machine, call the number on your CPAP machine to have a respiratory therapist come to your home to fit your  Secondary Diagnosis:	BPH (benign prostatic hyperplasia) Principal Discharge DX:	Coronary artery disease  Goal:	Continue home medications and follow with Dr. Gilbert on Monday, October 29th at 12 PM  Assessment and plan of treatment:	You came to the hospital for a catheterization and a second catheterization called an FFR which is more specialized. No intervention of your coronary arteries was performed. Your echo showed your heart function is 55-60%. You should continue the aspirin and atorvastatin 40mg and STOP your metoprolol until you see Dr. Gilbert in the office.  Secondary Diagnosis:	Atrial fibrillation  Assessment and plan of treatment:	You have atrial fibrillation and should continue on your Eliquis for anticoagulation 5mg every 12 hours. You were evaluated by electrophysiology who said that there was no need for the placement of a pacemaker.  Secondary Diagnosis:	Hypertension  Assessment and plan of treatment:	Continue your home medications. Stop your home metoprolol.  Secondary Diagnosis:	Hyperlipidemia  Assessment and plan of treatment:	Continue atorvastatin and follow with Dr. Gilbert and your primary care doctor.  Secondary Diagnosis:	Diabetes mellitus  Assessment and plan of treatment:	Continue your home medications and follow up with your primary care doctor.  Secondary Diagnosis:	MARTHA (obstructive sleep apnea)  Assessment and plan of treatment:	Continue using CPAP. Use the new mask you got in the hospital. If it does not fit your new machine, call the number on your CPAP machine to have a respiratory therapist come to your home to fit your  Secondary Diagnosis:	BPH (benign prostatic hyperplasia)  Assessment and plan of treatment:	Continue home tamsulosin and follow up with your primary care doctor.

## 2018-10-19 NOTE — PROGRESS NOTE ADULT - PROBLEM SELECTOR PLAN 1
- Cardiac cath: 10/15/18: LM nl. dLAD 75%. oD1 75%. mLCX 65%. oOM1 65%. EF 55%. EDP 21mmHg  - Cardiac cath with FFR 10/17/18 negative  - Echo 10/15/18 mild concentric LVH. EF 55-60%. Mild MR/TR. PASP 30mmHg  - Continue ASA  - Continue Atorvastatin 40mg  - Discontinued beta blocker given bradycardia and frequent pauses

## 2018-10-19 NOTE — PROGRESS NOTE ADULT - SUBJECTIVE AND OBJECTIVE BOX
OVERNIGHT EVENTS: Bradycardia to 50s (slow afib), Tachycardia to 130s-140s (Afib w RBR)    SUBJECTIVE / INTERVAL HPI: Patient seen and examined at bedside. Reports feeling well, refused CPAP overnight. No CP, no SOB, no dizziness. Tolerating PO.    VITAL SIGNS:  Vital Signs Last 24 Hrs  T(C): 37.1 (19 Oct 2018 09:01), Max: 37.2 (18 Oct 2018 22:12)  T(F): 98.7 (19 Oct 2018 09:01), Max: 98.9 (18 Oct 2018 22:12)  HR: 84 (19 Oct 2018 10:12) (60 - 84)  BP: 138/67 (19 Oct 2018 10:12) (129/73 - 180/86)  BP(mean): 110 (19 Oct 2018 07:44) (104 - 128)  RR: 17 (19 Oct 2018 10:12) (13 - 24)  SpO2: 99% (19 Oct 2018 07:44) (95% - 100%)    PHYSICAL EXAM:    General: Well developed, well nourished, no acute distress  HEENT: NC/AT; PERRL, anicteric sclera; MMM  Neck: supple  Cardiovascular: +S1/S2, RRR, no murmurs, rubs, gallops  Respiratory: CTA B/L; no W/R/R  Gastrointestinal: soft, NT/ND; +BSx4  Extremities: WWP; no edema, clubbing or cyanosis  Vascular: 2+ radial and pedal pulses  Neurological: AAOx3; no focal deficits    MEDICATIONS:  MEDICATIONS  (STANDING):  apixaban 5 milliGRAM(s) Oral every 12 hours  aspirin  chewable 81 milliGRAM(s) Oral daily  atorvastatin 40 milliGRAM(s) Oral at bedtime  chlorhexidine 4% Liquid 1 Application(s) Topical once  dextrose 5%. 1000 milliLiter(s) (50 mL/Hr) IV Continuous <Continuous>  dextrose 50% Injectable 12.5 Gram(s) IV Push once  dextrose 50% Injectable 25 Gram(s) IV Push once  dextrose 50% Injectable 25 Gram(s) IV Push once  insulin lispro (HumaLOG) corrective regimen sliding scale   SubCutaneous Before meals and at bedtime  lisinopril 5 milliGRAM(s) Oral daily  sodium chloride 0.9%. 500 milliLiter(s) (75 mL/Hr) IV Continuous <Continuous>  tamsulosin 0.4 milliGRAM(s) Oral at bedtime    MEDICATIONS  (PRN):  dextrose 40% Gel 15 Gram(s) Oral once PRN Blood Glucose LESS THAN 70 milliGRAM(s)/deciliter  glucagon  Injectable 1 milliGRAM(s) IntraMuscular once PRN Glucose LESS THAN 70 milligrams/deciliter  zaleplon 5 milliGRAM(s) Oral at bedtime PRN Insomnia      ALLERGIES:  Allergies    penicillins (Unknown)    Intolerances        LABS:                        14.7   6.5   )-----------( 175      ( 19 Oct 2018 07:15 )             43.1     10-19    138  |  100  |  20  ----------------------------<  123<H>  3.8   |  25  |  0.87    Ca    9.5      19 Oct 2018 07:15  Mg     2.1     10-19      PT/INR - ( 18 Oct 2018 12:45 )   PT: 15.1 sec;   INR: 1.35          PTT - ( 18 Oct 2018 12:45 )  PTT:41.9 sec    CAPILLARY BLOOD GLUCOSE    POCT Blood Glucose.: 143 mg/dL (19 Oct 2018 11:15)    RADIOLOGY & ADDITIONAL TESTS: Reviewed.

## 2018-10-19 NOTE — CONSULT NOTE ADULT - SUBJECTIVE AND OBJECTIVE BOX
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 Patient was transferred to [facility] for cath with FFR which showed  0.86 mLAD. FFR 0.87 to mLCX. RCA not injected.   Telemetry monitoring showed episodes of bradycardia and pauses ~ 2 sec to 4 sec.  EPS called to evaluate for pacemaker.    PAST MEDICAL & SURGICAL HISTORY:  Diabetes  Hyperlipidemia  Hypertension        PERTINENT DIAGNOSTIC TESTING:    [ ] Echocardiogram: 55% per cath report.       Allergies    penicillins (Unknown)    Intolerances    	    MEDICATIONS:  lisinopril 5 milliGRAM(s) Oral daily  tamsulosin 0.4 milliGRAM(s) Oral at bedtime  zaleplon 5 milliGRAM(s) Oral at bedtime PRN  atorvastatin 40 milliGRAM(s) Oral at bedtime  dextrose 40% Gel 15 Gram(s) Oral once PRN  dextrose 50% Injectable 12.5 Gram(s) IV Push once  dextrose 50% Injectable 25 Gram(s) IV Push once  dextrose 50% Injectable 25 Gram(s) IV Push once  glucagon  Injectable 1 milliGRAM(s) IntraMuscular once PRN  insulin lispro (HumaLOG) corrective regimen sliding scale   SubCutaneous Before meals and at bedtime  apixaban 5 milliGRAM(s) Oral every 12 hours  aspirin  chewable 81 milliGRAM(s) Oral daily  chlorhexidine 4% Liquid 1 Application(s) Topical once  dextrose 5%. 1000 milliLiter(s) IV Continuous <Continuous>  sodium chloride 0.9%. 500 milliLiter(s) IV Continuous <Continuous>      FAMILY HISTORY:      SOCIAL HISTORY:    [x ] Non-smoker      REVIEW OF SYSTEMS:    CONSTITUTIONAL: No fever, weight loss, or fatigue  EYES: No eye pain, visual disturbances, or discharge  ENMT:  No difficulty hearing, tinnitus, vertigo; No sinus or throat pain  NECK: No pain or stiffness  RESPIRATORY: No cough, wheezing, chills or hemoptysis; No Shortness of Breath  CARDIOVASCULAR: No chest pain, palpitations, dizziness, or leg swelling  GASTROINTESTINAL: No abdominal or epigastric pain. No nausea, vomiting, or hematemesis; No diarrhea or constipation.   GENITOURINARY: No dysuria, frequency, hematuria, or incontinence  NEUROLOGICAL: No headaches, memory loss, loss of strength, numbness, or tremors  SKIN: No itching, burning, rashes, or lesions   LYMPH Nodes: No enlarged glands  ENDOCRINE: No heat or cold intolerance; No hair loss  MUSCULOSKELETAL: No joint pain or swelling; No muscle, back, or extremity pain  PSYCHIATRIC: No depression, anxiety, mood swings, or difficulty sleeping    PHYSICAL EXAM:  T(C): 36.6 (10-19-18 @ 13:41), Max: 37.2 (10-18-18 @ 22:12)  HR: 84 (10-19-18 @ 13:32) (60 - 84)  BP: 134/62 (10-19-18 @ 13:32) (129/73 - 180/86)  RR: 16 (10-19-18 @ 13:32) (13 - 24)  SpO2: 97% (10-19-18 @ 13:32) (95% - 100%)  Wt(kg): --  I&O's Summary    18 Oct 2018 07:01  -  19 Oct 2018 07:00  --------------------------------------------------------  IN: 1077 mL / OUT: 0 mL / NET: 1077 mL    19 Oct 2018 07:01  -  19 Oct 2018 14:07  --------------------------------------------------------  IN: 990 mL / OUT: 0 mL / NET: 990 mL        TELEMETRY:   atrial fibrillation  ECG: < from: 12 Lead ECG (10.17.18 @ 20:53) >  Ventricular Rate 45 BPM    Atrial Rate 234 BPM    QRS Duration 84 ms    Q-T Interval 468 ms    QTC Calculation(Lala) 404 ms    R Axis 15 degrees    T Axis 110 degrees    Diagnosis Line Atrial flutter with variable AV block  ST & T wave abnormality, consider lateral ischemia      	  HEENT:  PERRL, EOMI	  Cardiovascular:  S1 S2, irregular,  edema  Respiratory: Lungs clear to auscultation	  Gastrointestinal:  Soft, Non-tender, + BS	  Neurologic: A&O x 3,  Extremities: no edema      	  LABS:	 	    CARDIAC MARKERS:                          14.7   6.5   )-----------( 175      ( 19 Oct 2018 07:15 )             43.1     10-19    138  |  100  |  20  ----------------------------<  123<H>  3.8   |  25  |  0.87    Ca    9.5      19 Oct 2018 07:15  Mg     2.1     10-19      proBNP:   Lipid Profile:   HgA1c: Hemoglobin A1C, Whole Blood: 6.3 % (10-19 @ 07:15)    TSH:     ASSESSMENT/PLAN: 	  79 yo M with history of  HTN, hyperlipidemia, DM , 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 Patient was transferred to [facility] for cath with FFR which showed  0.86 mLAD. FFR 0.87 to mLCX. RCA not injected.   Telemetry monitoring showed episodes of bradycardia and pauses ~ 2 sec to 4 sec.  Patient remain asymptomatic at a time of bradycardia, he denies any dizziness, syncope or lightheadedness. He episode of dizziness is most likely related to DM and hypoglycemia with relive of symptoms by orange juice. Would continue anticoagulation, restart BB at lower dose, no indication  for PPM at this time.

## 2018-10-19 NOTE — DISCHARGE NOTE ADULT - SECONDARY DIAGNOSIS.
Atrial fibrillation Hypertension Hyperlipidemia Diabetes mellitus MARTHA (obstructive sleep apnea) BPH (benign prostatic hyperplasia)

## 2018-10-19 NOTE — PROGRESS NOTE ADULT - PROBLEM SELECTOR PLAN 6
- Prior diagnosis of MARTHA, noncompliant with home CPAP  - Continue CPAP while inpatient, ordered but patient refusing

## 2018-10-19 NOTE — PROGRESS NOTE ADULT - ASSESSMENT
This is a 77 yo man with history of HTN, HLD, T2DM, CVA 2013 (residual R sided weakness and R facial droop) who presented as a transfer from [facility] for dizziness transferred for monitoring s/p cardiac cath with FFR.

## 2018-10-19 NOTE — DISCHARGE NOTE ADULT - PLAN OF CARE
Continue home medications and follow with Dr. Gilbert on Monday, October 29th at 12 PM You came to the hospital for a catheterization and a second catheterization called an FFR which is more specialized. No intervention of your coronary arteries was performed. Your echo showed your heart function is 55-60%. You should continue the aspirin and atorvastatin 40mg and STOP your metoprolol until you see Dr. Gilbert in the office. You have atrial fibrillation and should continue on your Eliquis for anticoagulation 5mg every 12 hours. You were evaluated by electrophysiology who said that there was no need for the placement of a pacemaker. Continue your home medications. Stop your home metoprolol. Continue atorvastatin and follow with Dr. Gilbert and your primary care doctor. Continue your home medications and follow up with your primary care doctor. Continue using CPAP. Use the new mask you got in the hospital. If it does not fit your new machine, call the number on your CPAP machine to have a respiratory therapist come to your home to fit your Continue home tamsulosin and follow up with your primary care doctor.

## 2018-10-24 DIAGNOSIS — E78.5 HYPERLIPIDEMIA, UNSPECIFIED: ICD-10-CM

## 2018-10-24 DIAGNOSIS — E11.649 TYPE 2 DIABETES MELLITUS WITH HYPOGLYCEMIA WITHOUT COMA: ICD-10-CM

## 2018-10-24 DIAGNOSIS — G47.33 OBSTRUCTIVE SLEEP APNEA (ADULT) (PEDIATRIC): ICD-10-CM

## 2018-10-24 DIAGNOSIS — I69.951 HEMIPLEGIA AND HEMIPARESIS FOLLOWING UNSPECIFIED CEREBROVASCULAR DISEASE AFFECTING RIGHT DOMINANT SIDE: ICD-10-CM

## 2018-10-24 DIAGNOSIS — I48.91 UNSPECIFIED ATRIAL FIBRILLATION: ICD-10-CM

## 2018-10-24 DIAGNOSIS — E66.01 MORBID (SEVERE) OBESITY DUE TO EXCESS CALORIES: ICD-10-CM

## 2018-10-24 DIAGNOSIS — I69.953 HEMIPLEGIA AND HEMIPARESIS FOLLOWING UNSPECIFIED CEREBROVASCULAR DISEASE AFFECTING RIGHT NON-DOMINANT SIDE: ICD-10-CM

## 2018-10-24 DIAGNOSIS — I25.110 ATHEROSCLEROTIC HEART DISEASE OF NATIVE CORONARY ARTERY WITH UNSTABLE ANGINA PECTORIS: ICD-10-CM

## 2018-10-24 DIAGNOSIS — Z79.84 LONG TERM (CURRENT) USE OF ORAL HYPOGLYCEMIC DRUGS: ICD-10-CM

## 2018-10-24 DIAGNOSIS — N40.0 BENIGN PROSTATIC HYPERPLASIA WITHOUT LOWER URINARY TRACT SYMPTOMS: ICD-10-CM

## 2018-10-24 DIAGNOSIS — Z91.19 PATIENT'S NONCOMPLIANCE WITH OTHER MEDICAL TREATMENT AND REGIMEN: ICD-10-CM

## 2018-10-24 DIAGNOSIS — I10 ESSENTIAL (PRIMARY) HYPERTENSION: ICD-10-CM

## 2018-10-24 DIAGNOSIS — I69.992 FACIAL WEAKNESS FOLLOWING UNSPECIFIED CEREBROVASCULAR DISEASE: ICD-10-CM

## 2018-10-29 ENCOUNTER — APPOINTMENT (OUTPATIENT)
Dept: HEART AND VASCULAR | Facility: CLINIC | Age: 78
End: 2018-10-29

## 2018-11-11 PROCEDURE — 36415 COLL VENOUS BLD VENIPUNCTURE: CPT

## 2018-11-11 PROCEDURE — 85027 COMPLETE CBC AUTOMATED: CPT

## 2018-11-11 PROCEDURE — C1769: CPT

## 2018-11-11 PROCEDURE — 93005 ELECTROCARDIOGRAM TRACING: CPT

## 2018-11-11 PROCEDURE — 80048 BASIC METABOLIC PNL TOTAL CA: CPT

## 2018-11-11 PROCEDURE — 83036 HEMOGLOBIN GLYCOSYLATED A1C: CPT

## 2018-11-11 PROCEDURE — 83735 ASSAY OF MAGNESIUM: CPT

## 2018-11-11 PROCEDURE — 84443 ASSAY THYROID STIM HORMONE: CPT

## 2018-11-11 PROCEDURE — 85730 THROMBOPLASTIN TIME PARTIAL: CPT

## 2018-11-11 PROCEDURE — 94660 CPAP INITIATION&MGMT: CPT

## 2018-11-11 PROCEDURE — 82962 GLUCOSE BLOOD TEST: CPT

## 2018-11-11 PROCEDURE — 80061 LIPID PANEL: CPT

## 2018-11-11 PROCEDURE — 85610 PROTHROMBIN TIME: CPT

## 2018-11-11 PROCEDURE — C1889: CPT

## 2018-11-11 PROCEDURE — C1887: CPT

## 2023-03-28 NOTE — PROGRESS NOTE ADULT - PROBLEM SELECTOR PLAN 5
- On home metformin and glyburide There are no Wet Read(s) to document. - On home metformin and glyburide  - Moderate SSI inpatient, accuchecks